# Patient Record
Sex: MALE | Race: WHITE | NOT HISPANIC OR LATINO | URBAN - METROPOLITAN AREA
[De-identification: names, ages, dates, MRNs, and addresses within clinical notes are randomized per-mention and may not be internally consistent; named-entity substitution may affect disease eponyms.]

---

## 2017-07-03 ENCOUNTER — EMERGENCY (EMERGENCY)
Facility: HOSPITAL | Age: 47
LOS: 0 days | Discharge: HOME | End: 2017-07-03

## 2017-07-03 DIAGNOSIS — R06.2 WHEEZING: ICD-10-CM

## 2017-07-03 DIAGNOSIS — S09.90XA UNSPECIFIED INJURY OF HEAD, INITIAL ENCOUNTER: ICD-10-CM

## 2017-07-03 DIAGNOSIS — S62.331A DISPLACED FRACTURE OF NECK OF SECOND METACARPAL BONE, LEFT HAND, INITIAL ENCOUNTER FOR CLOSED FRACTURE: ICD-10-CM
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
## 2017-07-03 DIAGNOSIS — S00.81XA ABRASION OF OTHER PART OF HEAD, INITIAL ENCOUNTER: ICD-10-CM

## 2017-07-03 DIAGNOSIS — Z98.890 OTHER SPECIFIED POSTPROCEDURAL STATES: ICD-10-CM
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
## 2017-07-03 DIAGNOSIS — Z87.891 PERSONAL HISTORY OF NICOTINE DEPENDENCE: ICD-10-CM

## 2017-07-03 DIAGNOSIS — M54.2 CERVICALGIA: ICD-10-CM

## 2017-07-03 DIAGNOSIS — M25.562 PAIN IN LEFT KNEE: ICD-10-CM

## 2022-03-21 ENCOUNTER — INPATIENT (INPATIENT)
Facility: HOSPITAL | Age: 52
LOS: 0 days | Discharge: HOME | End: 2022-03-22
Attending: STUDENT IN AN ORGANIZED HEALTH CARE EDUCATION/TRAINING PROGRAM | Admitting: STUDENT IN AN ORGANIZED HEALTH CARE EDUCATION/TRAINING PROGRAM
Payer: SELF-PAY

## 2022-03-21 VITALS
HEART RATE: 89 BPM | RESPIRATION RATE: 20 BRPM | OXYGEN SATURATION: 100 % | DIASTOLIC BLOOD PRESSURE: 102 MMHG | TEMPERATURE: 97 F | WEIGHT: 259.93 LBS | SYSTOLIC BLOOD PRESSURE: 187 MMHG | HEIGHT: 68 IN

## 2022-03-21 DIAGNOSIS — I10 ESSENTIAL (PRIMARY) HYPERTENSION: ICD-10-CM

## 2022-03-21 DIAGNOSIS — R07.9 CHEST PAIN, UNSPECIFIED: ICD-10-CM

## 2022-03-21 DIAGNOSIS — Z98.1 ARTHRODESIS STATUS: Chronic | ICD-10-CM

## 2022-03-21 LAB
ALBUMIN SERPL ELPH-MCNC: 4.6 G/DL — SIGNIFICANT CHANGE UP (ref 3.5–5.2)
ALP SERPL-CCNC: 87 U/L — SIGNIFICANT CHANGE UP (ref 30–115)
ALT FLD-CCNC: 35 U/L — SIGNIFICANT CHANGE UP (ref 0–41)
ANION GAP SERPL CALC-SCNC: 14 MMOL/L — SIGNIFICANT CHANGE UP (ref 7–14)
AST SERPL-CCNC: 21 U/L — SIGNIFICANT CHANGE UP (ref 0–41)
BASOPHILS # BLD AUTO: 0.1 K/UL — SIGNIFICANT CHANGE UP (ref 0–0.2)
BASOPHILS NFR BLD AUTO: 1.2 % — HIGH (ref 0–1)
BILIRUB SERPL-MCNC: 0.4 MG/DL — SIGNIFICANT CHANGE UP (ref 0.2–1.2)
BUN SERPL-MCNC: 21 MG/DL — HIGH (ref 10–20)
CALCIUM SERPL-MCNC: 9.7 MG/DL — SIGNIFICANT CHANGE UP (ref 8.5–10.1)
CHLORIDE SERPL-SCNC: 98 MMOL/L — SIGNIFICANT CHANGE UP (ref 98–110)
CHOLEST SERPL-MCNC: 216 MG/DL — HIGH
CK MB CFR SERPL CALC: 1.1 NG/ML — SIGNIFICANT CHANGE UP (ref 0.6–6.3)
CK MB CFR SERPL CALC: 1.2 NG/ML — SIGNIFICANT CHANGE UP (ref 0.6–6.3)
CK SERPL-CCNC: 61 U/L — SIGNIFICANT CHANGE UP (ref 0–225)
CK SERPL-CCNC: 62 U/L — SIGNIFICANT CHANGE UP (ref 0–225)
CO2 SERPL-SCNC: 26 MMOL/L — SIGNIFICANT CHANGE UP (ref 17–32)
CREAT SERPL-MCNC: 0.9 MG/DL — SIGNIFICANT CHANGE UP (ref 0.7–1.5)
EGFR: 103 ML/MIN/1.73M2 — SIGNIFICANT CHANGE UP
EOSINOPHIL # BLD AUTO: 0.35 K/UL — SIGNIFICANT CHANGE UP (ref 0–0.7)
EOSINOPHIL NFR BLD AUTO: 4.2 % — SIGNIFICANT CHANGE UP (ref 0–8)
GLUCOSE SERPL-MCNC: 213 MG/DL — HIGH (ref 70–99)
HCT VFR BLD CALC: 43.1 % — SIGNIFICANT CHANGE UP (ref 42–52)
HDLC SERPL-MCNC: 45 MG/DL — SIGNIFICANT CHANGE UP
HGB BLD-MCNC: 14.7 G/DL — SIGNIFICANT CHANGE UP (ref 14–18)
IMM GRANULOCYTES NFR BLD AUTO: 0.2 % — SIGNIFICANT CHANGE UP (ref 0.1–0.3)
LIPID PNL WITH DIRECT LDL SERPL: 135 MG/DL — HIGH
LYMPHOCYTES # BLD AUTO: 3.03 K/UL — SIGNIFICANT CHANGE UP (ref 1.2–3.4)
LYMPHOCYTES # BLD AUTO: 36 % — SIGNIFICANT CHANGE UP (ref 20.5–51.1)
MCHC RBC-ENTMCNC: 30.1 PG — SIGNIFICANT CHANGE UP (ref 27–31)
MCHC RBC-ENTMCNC: 34.1 G/DL — SIGNIFICANT CHANGE UP (ref 32–37)
MCV RBC AUTO: 88.3 FL — SIGNIFICANT CHANGE UP (ref 80–94)
MONOCYTES # BLD AUTO: 0.73 K/UL — HIGH (ref 0.1–0.6)
MONOCYTES NFR BLD AUTO: 8.7 % — SIGNIFICANT CHANGE UP (ref 1.7–9.3)
NEUTROPHILS # BLD AUTO: 4.19 K/UL — SIGNIFICANT CHANGE UP (ref 1.4–6.5)
NEUTROPHILS NFR BLD AUTO: 49.7 % — SIGNIFICANT CHANGE UP (ref 42.2–75.2)
NON HDL CHOLESTEROL: 171 MG/DL — HIGH
NRBC # BLD: 0 /100 WBCS — SIGNIFICANT CHANGE UP (ref 0–0)
PLATELET # BLD AUTO: 380 K/UL — SIGNIFICANT CHANGE UP (ref 130–400)
POTASSIUM SERPL-MCNC: 3.7 MMOL/L — SIGNIFICANT CHANGE UP (ref 3.5–5)
POTASSIUM SERPL-SCNC: 3.7 MMOL/L — SIGNIFICANT CHANGE UP (ref 3.5–5)
PROT SERPL-MCNC: 6.9 G/DL — SIGNIFICANT CHANGE UP (ref 6–8)
RBC # BLD: 4.88 M/UL — SIGNIFICANT CHANGE UP (ref 4.7–6.1)
RBC # FLD: 13.2 % — SIGNIFICANT CHANGE UP (ref 11.5–14.5)
SARS-COV-2 RNA SPEC QL NAA+PROBE: SIGNIFICANT CHANGE UP
SODIUM SERPL-SCNC: 138 MMOL/L — SIGNIFICANT CHANGE UP (ref 135–146)
TRIGL SERPL-MCNC: 345 MG/DL — HIGH
TROPONIN T SERPL-MCNC: <0.01 NG/ML — SIGNIFICANT CHANGE UP
TSH SERPL-MCNC: 2.42 UIU/ML — SIGNIFICANT CHANGE UP (ref 0.27–4.2)
WBC # BLD: 8.42 K/UL — SIGNIFICANT CHANGE UP (ref 4.8–10.8)
WBC # FLD AUTO: 8.42 K/UL — SIGNIFICANT CHANGE UP (ref 4.8–10.8)

## 2022-03-21 PROCEDURE — 99285 EMERGENCY DEPT VISIT HI MDM: CPT

## 2022-03-21 PROCEDURE — 99053 MED SERV 10PM-8AM 24 HR FAC: CPT

## 2022-03-21 PROCEDURE — 99252 IP/OBS CONSLTJ NEW/EST SF 35: CPT

## 2022-03-21 PROCEDURE — 93010 ELECTROCARDIOGRAM REPORT: CPT

## 2022-03-21 PROCEDURE — 71045 X-RAY EXAM CHEST 1 VIEW: CPT | Mod: 26

## 2022-03-21 PROCEDURE — ZZZZZ: CPT

## 2022-03-21 PROCEDURE — 93010 ELECTROCARDIOGRAM REPORT: CPT | Mod: 77

## 2022-03-21 RX ORDER — CHLORHEXIDINE GLUCONATE 213 G/1000ML
1 SOLUTION TOPICAL
Refills: 0 | Status: DISCONTINUED | OUTPATIENT
Start: 2022-03-21 | End: 2022-03-22

## 2022-03-21 RX ORDER — AMLODIPINE BESYLATE 2.5 MG/1
5 TABLET ORAL DAILY
Refills: 0 | Status: DISCONTINUED | OUTPATIENT
Start: 2022-03-21 | End: 2022-03-22

## 2022-03-21 RX ORDER — PANTOPRAZOLE SODIUM 20 MG/1
40 TABLET, DELAYED RELEASE ORAL
Refills: 0 | Status: DISCONTINUED | OUTPATIENT
Start: 2022-03-21 | End: 2022-03-22

## 2022-03-21 RX ORDER — ACETAMINOPHEN 500 MG
650 TABLET ORAL EVERY 6 HOURS
Refills: 0 | Status: DISCONTINUED | OUTPATIENT
Start: 2022-03-21 | End: 2022-03-22

## 2022-03-21 RX ORDER — PANTOPRAZOLE SODIUM 20 MG/1
40 TABLET, DELAYED RELEASE ORAL ONCE
Refills: 0 | Status: COMPLETED | OUTPATIENT
Start: 2022-03-21 | End: 2022-03-21

## 2022-03-21 RX ORDER — ASPIRIN/CALCIUM CARB/MAGNESIUM 324 MG
324 TABLET ORAL ONCE
Refills: 0 | Status: COMPLETED | OUTPATIENT
Start: 2022-03-21 | End: 2022-03-21

## 2022-03-21 RX ORDER — ATORVASTATIN CALCIUM 80 MG/1
40 TABLET, FILM COATED ORAL AT BEDTIME
Refills: 0 | Status: DISCONTINUED | OUTPATIENT
Start: 2022-03-21 | End: 2022-03-22

## 2022-03-21 RX ORDER — ENOXAPARIN SODIUM 100 MG/ML
40 INJECTION SUBCUTANEOUS EVERY 24 HOURS
Refills: 0 | Status: DISCONTINUED | OUTPATIENT
Start: 2022-03-21 | End: 2022-03-22

## 2022-03-21 RX ADMIN — PANTOPRAZOLE SODIUM 40 MILLIGRAM(S): 20 TABLET, DELAYED RELEASE ORAL at 08:29

## 2022-03-21 RX ADMIN — Medication 324 MILLIGRAM(S): at 06:18

## 2022-03-21 RX ADMIN — Medication 650 MILLIGRAM(S): at 17:31

## 2022-03-21 RX ADMIN — Medication 650 MILLIGRAM(S): at 18:01

## 2022-03-21 RX ADMIN — ATORVASTATIN CALCIUM 40 MILLIGRAM(S): 80 TABLET, FILM COATED ORAL at 22:33

## 2022-03-21 RX ADMIN — AMLODIPINE BESYLATE 5 MILLIGRAM(S): 2.5 TABLET ORAL at 08:28

## 2022-03-21 NOTE — ED PROVIDER NOTE - ATTENDING CONTRIBUTION TO CARE
I personally evaluated the patient. I reviewed the Resident´s or Physician Assistant´s note (as assigned above), and agree with the findings and plan except as documented in my note.  51-year-old male, history of cervical fusion, presents with chest tightness and lightheadedness while watching TV tonight.  No fever or cough.  Unvaccinated for Covid.  Denies cocaine use.  Exam shows alert patient in no distress, HEENT NCAT PERRL, neck supple, lungs clear, RR S1S2, abdomen soft NT +BS, no CCE, neuro A&OX3 GCS 15 no deficits. I personally evaluated the patient. I reviewed the Resident´s or Physician Assistant´s note (as assigned above), and agree with the findings and plan except as documented in my note.  51-year-old male, history of cervical fusion, diverticulitis, presents with chest tightness and lightheadedness while watching TV tonight.  No fever or cough.  Unvaccinated for Covid.  Denies cocaine use.  Exam shows alert patient in no distress, HEENT NCAT PERRL, neck supple, lungs clear, RR S1S2, abdomen soft NT +BS, no CCE, neuro A&OX3 GCS 15 no deficits.

## 2022-03-21 NOTE — ED PROVIDER NOTE - NSCAREINITIATED _GEN_ER
[place]) ROM/gait, locomotion, and balance/muscle strength/joint integrity and mobility/ergonomics and body mechanics

## 2022-03-21 NOTE — CONSULT NOTE ADULT - SUBJECTIVE AND OBJECTIVE BOX
HPI:  Pt is a 51M w/ PMH diverticulitis and chronic back pain being admitted for chest tightness r/o ACS. Pt reports onset of intermittent substernal chest tightness around 4AM this morning. Pt states that when the pain starts he has difficulty breathing and feels the need to belch which then relieves the pain. Pain is non-exertional, and non-pleuritic. Pt reports this has never happened to him before. Pt endorses daily NSAID use, ibuprofen 800mg 1-2x per day, which he utilizes for frequent HAs 2/2 chronic cervical spine issues. Denies HA, dizziness, fever/chills, diaphoresis, NVD, abdominal pain, change in urination and change in BM.     In ED, pt is afebrile w/ WBC: 8.4. BP: 187/102 --> 150/70, HR: 89, BUN/cr: 21/0.9, trop (-) x1, CXR pending. Pt was given ASA 325mg x1.  (21 Mar 2022 07:22)        HPI-Cardiology   Pt evaluated at bedside with Dr Celis. Currently admitted in telemetry for evaluation of chest tightness r/o ACS. Radiology tests and hospital records, were reviewed, as well as previous notes on this patient. Pt describes the pain as more discomfort than tightness. Sates that it started this am and was trying to "catch his breath" and "belch". States that once he was able to catch is breath the discomfort disappeared. Pt states that he has GERD, and has had similar episodes, but not discomfort like this. Pt currently denies any CP, SOB or palpitations. Denies any dizziness or lightheadedness.        PAST MEDICAL & SURGICAL HISTORY  History of diverticulitis    Chronic back pain    History of fusion of cervical spine        FAMILY HISTORY:  FAMILY HISTORY:      SOCIAL HISTORY:  []smoker-former  []Alcohol-socially  []Drug-denies    ALLERGIES:  No Known Allergies      MEDICATIONS:  MEDICATIONS  (STANDING):  amLODIPine   Tablet 5 milliGRAM(s) Oral daily  chlorhexidine 4% Liquid 1 Application(s) Topical <User Schedule>  enoxaparin Injectable 40 milliGRAM(s) SubCutaneous every 24 hours  pantoprazole    Tablet 40 milliGRAM(s) Oral before breakfast    MEDICATIONS  (PRN):  acetaminophen     Tablet .. 650 milliGRAM(s) Oral every 6 hours PRN Temp greater or equal to 38C (100.4F), Mild Pain (1 - 3)  aluminum hydroxide/magnesium hydroxide/simethicone Suspension 30 milliLiter(s) Oral every 6 hours PRN Dyspepsia      HOME MEDICATIONS:  Home Medications:      VITALS:   T(F): 97.5 (03-21 @ 13:43), Max: 97.5 (03-21 @ 13:43)  HR: 62 (03-21 @ 13:43) (62 - 89)  BP: 146/72 (03-21 @ 13:43) (146/72 - 187/102)  BP(mean): --  RR: 16 (03-21 @ 13:43) (16 - 20)  SpO2: 100% (03-21 @ 07:19) (100% - 100%)    I&O's Summary      REVIEW OF SYSTEMS:  CONSTITUTIONAL: No weakness, fevers or chills  EYES: No visual changes  ENT: No vertigo or throat pain   NECK: No pain or stiffness  RESPIRATORY: No cough, wheezing, hemoptysis; No shortness of breath  CARDIOVASCULAR: No chest pain or palpitations  GASTROINTESTINAL: No abdominal or epigastric pain. No nausea, vomiting, or hematemesis; No diarrhea or constipation. No melena or hematochezia.  GENITOURINARY: No dysuria, frequency or hematuria  NEUROLOGICAL: No numbness or weakness  SKIN: No itching, no rashes  MSK: no    PHYSICAL EXAM:  NEURO: patient is awake , alert and oriented  GEN: Not in acute distress  NECK: no thyroid enlargement, no JVD  LUNGS: Clear to auscultation bilaterally   CARDIOVASCULAR: S1/S2 present, RRR , no murmurs or rubs, no carotid bruits,  + PP bilaterally  ABD: Soft, non-tender, non-distended, +BS  EXT: No SALO  SKIN: Intact    LABS:                        14.7   8.42  )-----------( 380      ( 21 Mar 2022 06:00 )             43.1     03-21    138  |  98  |  21<H>  ----------------------------<  213<H>  3.7   |  26  |  0.9    Ca    9.7      21 Mar 2022 06:00    TPro  6.9  /  Alb  4.6  /  TBili  0.4  /  DBili  x   /  AST  21  /  ALT  35  /  AlkPhos  87  03-21      Creatine Kinase, Serum: 61 U/L (03-21-22 @ 16:01)  Troponin T, Serum: <0.01 ng/mL (03-21-22 @ 16:01)  Troponin T, Serum: <0.01 ng/mL (03-21-22 @ 06:00)    CARDIAC MARKERS ( 21 Mar 2022 16:01 )  x     / <0.01 ng/mL / 61 U/L / x     / 1.2 ng/mL  CARDIAC MARKERS ( 21 Mar 2022 06:00 )  x     / <0.01 ng/mL / x     / x     / x            03-21 Chol 216<H> LDL -- HDL 45 Trig 345<H>      RADIOLOGY:  -CXR:  < from: Xray Chest 1 View- PORTABLE-Urgent (03.21.22 @ 05:58) >  Impression:    No radiographic evidence of acute cardiopulmonary disease.        --- End of Report ---    < end of copied text >      ECG:  < from: 12 Lead ECG (03.21.22 @ 10:10) >  Sinus bradycardia  Left anterior fascicular block  Poor R wave progression  Nonspecific ST and T wave abnormality  Abnormal ECG    Confirmed by Yusuf Celis ([id number]) on 3/21/2022 10:52:04 AM    < end of copied text >

## 2022-03-21 NOTE — H&P ADULT - PROBLEM SELECTOR PLAN 1
-  - Relieved with belching; known chronic NSAID user  - First trop negative  - Trend cardiac enzymes   - Repeat EKG  - Start protonix   - Advised about risks of daily NSAID use and to utilize NSAIDs w/ food  - Check HgA1C and lipid panel

## 2022-03-21 NOTE — H&P ADULT - NSHPPHYSICALEXAM_GEN_ALL_CORE
T(C): 36 (03-21-22 @ 05:43), Max: 36 (03-21-22 @ 05:43)  HR: 84 (03-21-22 @ 07:19) (84 - 89)  BP: 150/70 (03-21-22 @ 07:19) (150/70 - 187/102)  RR: 18 (03-21-22 @ 07:19) (18 - 20)  SpO2: 100% (03-21-22 @ 07:19) (100% - 100%)    PHYSICAL EXAM:  GENERAL: NAD, AOx3  HEAD:  Atraumatic, Normocephalic  EYES: PERRLA, conjunctiva and sclera clear  NECK: Supple  CHEST/LUNG: Clear to auscultation bilaterally; No rales, rhonchi or wheezing  HEART: S1,S2 Regular rate and rhythm; No murmurs, rubs, or gallops  ABDOMEN: Soft, nontender, nondistended, no rebound tenderness;  +BS  EXTREMITIES:  2+ peripheral pulses bilaterally and symmetrically, no clubbing, cyanosis, or edema  PSYCH: AAOx3, normal mood and affect  SKIN: No rashes or lesions

## 2022-03-21 NOTE — ED ADULT NURSE REASSESSMENT NOTE - NS ED NURSE REASSESS COMMENT FT1
Received patient from previous RN resting on stretcher. Awake alert and oriented x 3 breathing with ease on room air. Reports chest pressure comes and goes. VS WNL. Admitting MD at bedside for evaluation. Explanation of care provided. Safety and comfort measures in place.

## 2022-03-21 NOTE — ED PROVIDER NOTE - CLINICAL SUMMARY MEDICAL DECISION MAKING FREE TEXT BOX
Labs including troponin negative.  Covid swab negative.  EKG normal sinus rhythm no ST elevation.  Chest x-ray negative.  Will admit to low risk telemetry.

## 2022-03-21 NOTE — H&P ADULT - HISTORY OF PRESENT ILLNESS
Pt is a 51M w/ PMH diverticulitis and chronic back pain being admitted for chest tightness r/o ACS. Pt reports onset of intermittent substernal chest tightness around 4AM this morning. Pt states that when the pain starts he has difficulty breathing and feels the need to belch which then relieves the pain. Pain is non-exertional, and non-pleuritic. Pt reports this has never happened to him before. Pt endorses daily NSAID use, ibuprofen 800mg 1-2x per day, which he utilizes for frequent HAs 2/2 chronic cervical spine issues. Denies HA, dizziness, fever/chills, diaphoresis, NVD, abdominal pain, change in urination and change in BM.     In ED, pt is afebrile w/ WBC: 8.4. BP: 187/102 --> 150/70, HR: 89, BUN/cr: 21/0.9, trop (-) x1, CXR pending. Pt was given ASA 325mg x1.

## 2022-03-21 NOTE — CONSULT NOTE ADULT - ATTENDING COMMENTS
Patient seen and examined. Pertinent labs, imaging and telemetry reviewed. I agree with the above.     Patient without CP. Found to have bradycardia, but unknown if at baseline.   Elevated lipids, with history of smoking-ASCVD 5.6% 10 year risk (borderline).     R/O ACS:  -Trop negative and EKG nonischemic  -CCTA today. Will betablock as needed.   -ASA and atorvastatin 40mg PO daily.     Bradycardia: HR 50-60s, drops to 30s in sleep  -May be baseline.  -Will continue to monitor.   -If remains low, may need MCOT monitor as outpatient.   -F/U TSH    Discussed with NP

## 2022-03-21 NOTE — ED PROVIDER NOTE - NS ED ATTENDING STATEMENT MOD
This was a shared visit with the DAVID. I reviewed and verified the documentation and independently performed the documented:

## 2022-03-21 NOTE — H&P ADULT - NSHPLABSRESULTS_GEN_ALL_CORE
14.7   8.42  )-----------( 380      ( 21 Mar 2022 06:00 )             43.1       03-21    138  |  98  |  21<H>  ----------------------------<  213<H>  3.7   |  26  |  0.9    Ca    9.7      21 Mar 2022 06:00    TPro  6.9  /  Alb  4.6  /  TBili  0.4  /  DBili  x   /  AST  21  /  ALT  35  /  AlkPhos  87  03-21            CARDIAC MARKERS ( 21 Mar 2022 06:00 )  x     / <0.01 ng/mL / x     / x     / x          CXR pending

## 2022-03-21 NOTE — H&P ADULT - PROBLEM SELECTOR PLAN 2
-  - 187/102 --> 150/70  - No known prior history  - Start Norvasc 5mg QD   - Monitor BP   - DASH diet

## 2022-03-21 NOTE — ED ADULT NURSE NOTE - SUICIDE SCREENING DEPRESSION
Negative Patient complains of headache, cough, rhinorrhea, congestion, ear pain that started sunday and throat pain that started last night

## 2022-03-21 NOTE — H&P ADULT - ATTENDING COMMENTS
Mr Hernandez is a 51M w/ PMH diverticulitis and chronic back pain being admitted for chest tightness r/o ACS. Pain is relieved with belching. Pt endorses daily NSAID use, ibuprofen 800mg 1-2x per day.  hx suggestive of GI etiology.  Given age of 51 and last colonoscopy 15 yrs ago, pt will benefit from outpatient EGD/Colonoscopy.  will start patient on Protonix.  repeat Troponins x3.  will get A1c and lipid profile.  Pt has morbid obesity.    patient likely has metabolic syndrome.  Will need outpatient f/u for evaluation and management of HTN and Obesity.

## 2022-03-21 NOTE — ED PROVIDER NOTE - OBJECTIVE STATEMENT
52 yo male hx of diverticulitis present c/o feeling chest tightness and lightheadedness while he was watching TV this am. feeling tingling down his left arm so he comes to ED for evaluation. denies similar sxs in the past. denies diaphoresis/weakness and sob associates with chest discomfort. Denies exogenous hormone use/recent hospitalization/hx of DVT. denies recent illness/fever/chill/HA/dizziness/sob/abd pain/n/v/d/urinary sxs.

## 2022-03-21 NOTE — H&P ADULT - ASSESSMENT
ASSESSMENT: Pt is a 51M w/ PMH diverticulitis and chronic back pain being admitted for chest tightness r/o ACS. Pain is relieved with belching. Pt endorses daily NSAID use, ibuprofen 800mg 1-2x per day.  In ED, pt is afebrile w/ WBC: 8.4. BP: 187/102 --> 150/70, HR: 89, BUN/cr: 21/0.9, trop (-) x1, CXR pending. Pt was given ASA 325mg x1.         PLAN: Case d/w Dr. Rashid   - Admit to inpatient level of care - non-ccu tele  - Ambulate as tolerated   - Monitor vitals  - Daily labs   - CHG bath   - DASH diet   - VTE ppx: SCDs   - GI ppx: start protonix   - Tylenol PRN for neck pain/HA ASSESSMENT: Pt is a 51M w/ PMH diverticulitis and chronic back pain being admitted for chest tightness r/o ACS. Pain is relieved with belching. Pt endorses daily NSAID use, ibuprofen 800mg 1-2x per day.  In ED, pt is afebrile w/ WBC: 8.4. BP: 187/102 --> 150/70, HR: 89, BUN/cr: 21/0.9, trop (-) x1, CXR pending. Pt was given ASA 325mg x1.         PLAN: Case d/w Dr. Rashid   - Admit to inpatient level of care - non-ccu tele  - Ambulate as tolerated   - Monitor vitals  - Daily labs   - CHG bath   - DASH diet   - VTE ppx: SQ lovenox   - GI ppx: start protonix   - Tylenol PRN for neck pain/HA

## 2022-03-21 NOTE — PATIENT PROFILE ADULT - FALL HARM RISK - UNIVERSAL INTERVENTIONS
Bed in lowest position, wheels locked, appropriate side rails in place/Call bell, personal items and telephone in reach/Instruct patient to call for assistance before getting out of bed or chair/Non-slip footwear when patient is out of bed/[place] to call system/Physically safe environment - no spills, clutter or unnecessary equipment/Purposeful Proactive Rounding/Room/bathroom lighting operational, light cord in reach

## 2022-03-21 NOTE — CONSULT NOTE ADULT - ASSESSMENT
51M w/ PMH diverticulitis and chronic back pain was admitted for chest discomfort r/o ACS. Found to have bradycardia on ECG and telemonitor      Impression:  #Chest discomfort: r/o ACS  #Bradycardia  -Atypical CP  -Asymptomatic susu  -Trop flat x2  -CKMB, CK are WNL  -ECG: Bradycardia, non ischemic, no heart blocks  -TTE pending  -Cxr: unremarkable    Trig 345  Chol 216    Plan:  -Currently CP free  -Trend trop x1 more  -Rpeat ECG in am  -CCTA tomorrow 3/22  -Check TSH, A1C  -When ambulating HR>60   -Start Lipitor 40mg PO daily  -Monitor lytes and replete as needed    Discussed with Dr Celis

## 2022-03-22 VITALS — TEMPERATURE: 97 F | HEART RATE: 61 BPM | SYSTOLIC BLOOD PRESSURE: 152 MMHG | DIASTOLIC BLOOD PRESSURE: 68 MMHG

## 2022-03-22 LAB
A1C WITH ESTIMATED AVERAGE GLUCOSE RESULT: 6.5 % — HIGH (ref 4–5.6)
ANION GAP SERPL CALC-SCNC: 10 MMOL/L — SIGNIFICANT CHANGE UP (ref 7–14)
BUN SERPL-MCNC: 19 MG/DL — SIGNIFICANT CHANGE UP (ref 10–20)
CALCIUM SERPL-MCNC: 9.7 MG/DL — SIGNIFICANT CHANGE UP (ref 8.5–10.1)
CHLORIDE SERPL-SCNC: 102 MMOL/L — SIGNIFICANT CHANGE UP (ref 98–110)
CO2 SERPL-SCNC: 29 MMOL/L — SIGNIFICANT CHANGE UP (ref 17–32)
CREAT SERPL-MCNC: 0.9 MG/DL — SIGNIFICANT CHANGE UP (ref 0.7–1.5)
EGFR: 103 ML/MIN/1.73M2 — SIGNIFICANT CHANGE UP
ESTIMATED AVERAGE GLUCOSE: 140 MG/DL — HIGH (ref 68–114)
GLUCOSE SERPL-MCNC: 142 MG/DL — HIGH (ref 70–99)
HCT VFR BLD CALC: 45.8 % — SIGNIFICANT CHANGE UP (ref 42–52)
HGB BLD-MCNC: 15.7 G/DL — SIGNIFICANT CHANGE UP (ref 14–18)
MCHC RBC-ENTMCNC: 30.5 PG — SIGNIFICANT CHANGE UP (ref 27–31)
MCHC RBC-ENTMCNC: 34.3 G/DL — SIGNIFICANT CHANGE UP (ref 32–37)
MCV RBC AUTO: 88.9 FL — SIGNIFICANT CHANGE UP (ref 80–94)
NRBC # BLD: 0 /100 WBCS — SIGNIFICANT CHANGE UP (ref 0–0)
PLATELET # BLD AUTO: 384 K/UL — SIGNIFICANT CHANGE UP (ref 130–400)
POTASSIUM SERPL-MCNC: 4.7 MMOL/L — SIGNIFICANT CHANGE UP (ref 3.5–5)
POTASSIUM SERPL-SCNC: 4.7 MMOL/L — SIGNIFICANT CHANGE UP (ref 3.5–5)
RBC # BLD: 5.15 M/UL — SIGNIFICANT CHANGE UP (ref 4.7–6.1)
RBC # FLD: 12.9 % — SIGNIFICANT CHANGE UP (ref 11.5–14.5)
SODIUM SERPL-SCNC: 141 MMOL/L — SIGNIFICANT CHANGE UP (ref 135–146)
WBC # BLD: 8.08 K/UL — SIGNIFICANT CHANGE UP (ref 4.8–10.8)
WBC # FLD AUTO: 8.08 K/UL — SIGNIFICANT CHANGE UP (ref 4.8–10.8)

## 2022-03-22 PROCEDURE — 75571 CT HRT W/O DYE W/CA TEST: CPT | Mod: 26

## 2022-03-22 PROCEDURE — 93306 TTE W/DOPPLER COMPLETE: CPT | Mod: 26

## 2022-03-22 PROCEDURE — 99233 SBSQ HOSP IP/OBS HIGH 50: CPT

## 2022-03-22 PROCEDURE — 99232 SBSQ HOSP IP/OBS MODERATE 35: CPT

## 2022-03-22 RX ORDER — LOSARTAN POTASSIUM 100 MG/1
25 TABLET, FILM COATED ORAL DAILY
Refills: 0 | Status: DISCONTINUED | OUTPATIENT
Start: 2022-03-22 | End: 2022-03-22

## 2022-03-22 RX ORDER — ATORVASTATIN CALCIUM 80 MG/1
1 TABLET, FILM COATED ORAL
Qty: 30 | Refills: 0
Start: 2022-03-22 | End: 2022-04-20

## 2022-03-22 RX ORDER — METFORMIN HYDROCHLORIDE 850 MG/1
1 TABLET ORAL
Qty: 60 | Refills: 0
Start: 2022-03-22 | End: 2022-04-20

## 2022-03-22 RX ORDER — NITROGLYCERIN 6.5 MG
0.8 CAPSULE, EXTENDED RELEASE ORAL ONCE
Refills: 0 | Status: DISCONTINUED | OUTPATIENT
Start: 2022-03-22 | End: 2022-03-22

## 2022-03-22 RX ORDER — AMLODIPINE BESYLATE 2.5 MG/1
1 TABLET ORAL
Qty: 30 | Refills: 0
Start: 2022-03-22 | End: 2022-04-20

## 2022-03-22 RX ORDER — LOSARTAN POTASSIUM 100 MG/1
1 TABLET, FILM COATED ORAL
Qty: 30 | Refills: 0
Start: 2022-03-22 | End: 2022-04-20

## 2022-03-22 RX ORDER — ASPIRIN/CALCIUM CARB/MAGNESIUM 324 MG
81 TABLET ORAL DAILY
Refills: 0 | Status: DISCONTINUED | OUTPATIENT
Start: 2022-03-22 | End: 2022-03-22

## 2022-03-22 RX ORDER — ASPIRIN/CALCIUM CARB/MAGNESIUM 324 MG
1 TABLET ORAL
Qty: 30 | Refills: 0
Start: 2022-03-22 | End: 2022-04-20

## 2022-03-22 RX ADMIN — AMLODIPINE BESYLATE 5 MILLIGRAM(S): 2.5 TABLET ORAL at 06:43

## 2022-03-22 RX ADMIN — PANTOPRAZOLE SODIUM 40 MILLIGRAM(S): 20 TABLET, DELAYED RELEASE ORAL at 06:43

## 2022-03-22 RX ADMIN — Medication 650 MILLIGRAM(S): at 13:00

## 2022-03-22 RX ADMIN — Medication 650 MILLIGRAM(S): at 12:13

## 2022-03-22 RX ADMIN — ENOXAPARIN SODIUM 40 MILLIGRAM(S): 100 INJECTION SUBCUTANEOUS at 12:14

## 2022-03-22 NOTE — DISCHARGE NOTE PROVIDER - CARE PROVIDER_API CALL
Arnaldo Aragon (MD)  Critical Care Medicine; Internal Medicine; Pulmonary Disease  [address], [address]  [place] [address]  Phone: [phone]  Fax: [phone]  Follow Up Time: 2 weeks    PCP,   Phone: (   )    -  Fax: (   )    -  Follow Up Time: 1 week    Yusuf Celis (DO)  Cardiovascular Disease; Internal Medicine  [address] [place] [address]  Phone: [phone]  Fax: [phone]  Follow Up Time: 2 weeks    Shaun Silva)  EndocrinologyMetabDiabetes; Internal Medicine  [address] [place] [address]  Phone: [phone]  Fax: [phone]  Follow Up Time: 2 weeks

## 2022-03-22 NOTE — DISCHARGE NOTE NURSING/CASE MANAGEMENT/SOCIAL WORK - PATIENT PORTAL LINK FT
You can access the FollowMyHealth Patient Portal offered by [facility] by registering at the following website: [email or web address]. By joining [facility]’s FollowMyHealth portal, you will also be able to view your health information using other applications (apps) compatible with our system.

## 2022-03-22 NOTE — DISCHARGE NOTE PROVIDER - NSDCCONDITION_GEN_ALL_CORE
Writer contacted patient at appt time. Phone didn't answer. Left voicemail for patient mother regarding missed appointment.   Stable

## 2022-03-22 NOTE — DISCHARGE NOTE NURSING/CASE MANAGEMENT/SOCIAL WORK - NSDCPEFALRISK_GEN_ALL_CORE
For information on Fall & Injury Prevention, visit: [email or web address] OR  [email or web address] OR  https://www.cdc.gov/steadi/patient.html

## 2022-03-22 NOTE — DISCHARGE NOTE PROVIDER - CARE PROVIDERS DIRECT ADDRESSES
,DirectAddress_Unknown,DirectAddress_Unknown,DirectAddress_Unknown,[email or web address]

## 2022-03-22 NOTE — DISCHARGE NOTE PROVIDER - HOSPITAL COURSE
Mr Hernandez is a 51M w/ MHx of diverticulitis and chronic back pain being admitted to telemetry for chest tightness r/o ACS. Pain is relieved with belching.     #Acute chest pain   #Bradycardia   #HLD   Relieved with belching; known chronic NSAID user  trop negative x 3  Advised about risks of daily NSAID use and to utilize NSAIDs w/ food  Cholesterol, Serum: 216 mg/dL, Triglycerides, Serum: 345 mg/dL, HDL Cholesterol, Serum: 45 mg/dL  TSH 2.42  c/w protonix    Initiated Atorvastatin 40mg qhs   planned for CCTA - unable to do due to high calcium score  TTE - normal LV function, EF 68%  start ASA 81mg   outpatient follow up with cardiology for stress test       #Metabolic Syndrome   #New Onset DM II   Waist Circumference of >40  elevated BP   insulin resistance   Lifestyle Modifications   will initiate Metformin 500mg BID on discharge   outpatient f/u with Endocrinologist       #Hypertension.   187/102 --> 150/70> 141/66  No known prior history  c/w Norvasc 5mg QD   start Losartan 25mg daily, will increase to Losartan 50 mg po qd on discharge  DASH diet    Pt is stable for a medical dc with outpatient follow up with cardiology, endocrinology and pulmonary for sleep study

## 2022-03-22 NOTE — CHART NOTE - NSCHARTNOTEFT_GEN_A_CORE
RN notified me of persistent   SINUS LUCY CARDIA     d/w dr. Rashid   was seen by him     plan :  TSH level   cards consult   echo   still needs to be r/o ACS    rn aware
Patient underwent CCTA for chest pain work up today. Given high coronary artery calcium burden, unable to complete CCTA with contrast.   < from: CT Heart Calcium Score (03.22.22 @ 10:26) >    Vessel              Calcium Score    =======================================================  LM:                    32  LAD:                 472  LCX:                 229  RCA:                 1612  =======================================================  Total:                2345        The heart is normal in size. There are aortic calcifications. There is no   pleural or pericardial effusion. There is a 2 mm nodule within the right   middle lobe. Please note that the entire lungs were not imaged.    Images of the upper abdomen demonstrate no abnormality.    There is partially imaged cervical spine hardware.    The total Agatston coronary artery calcium score equals 2345, which   corresponds to 99th percentile for age, gender and ethnicity. Dense   coronary artery calcium precludes the evaluation of the coronary arteries   with intravenous contrast.    There is a 2 mm nodule within the right middle lobe. In a high-risk   patient, noncontrast CT chest recommended in 12 months to assess for   stability.    < end of copied text >    Given findings of significant calcium in LAD, would have further work up with inpatient stress testing, likely Nuclear stress. In addition to aggressive risk factor modification.   Will discuss with patient.

## 2022-03-22 NOTE — DISCHARGE NOTE PROVIDER - NSDCCPCAREPLAN_GEN_ALL_CORE_FT
PRINCIPAL DISCHARGE DIAGNOSIS  Diagnosis: Chest pain  Assessment and Plan of Treatment: your were admitted to hospital and treated  your symptoms improved  please follow up with cardiology for outpatient stress test and pulmonigy for outpatient sleep study      SECONDARY DISCHARGE DIAGNOSES  Diagnosis: Diabetes  Assessment and Plan of Treatment: you are started on metformin for diabetes  please take it as directed  follow up with endocrinology in 1-2 weeks for reassessment  follow carb consistent diet    Diagnosis: HTN (hypertension)  Assessment and Plan of Treatment: please monitor your blood pressure daily and take amlodipine and losartan as directed  limit salt intake to 2g/day   follow up with your PCP in 1-2 weeks for reassessment

## 2022-03-22 NOTE — PROGRESS NOTE ADULT - SUBJECTIVE AND OBJECTIVE BOX
ORLYCHARU  51y, Male  Allergy: No Known Allergies    Hospital Day: 1d    Patient seen and examined earlier today.  no complaints.  requesting to go home today.     PMH/PSH:  PAST MEDICAL & SURGICAL HISTORY:  History of diverticulitis    Chronic back pain    History of fusion of cervical spine        LAST 24-Hr EVENTS:    VITALS:  T(F): 97.1 (03-22-22 @ 05:41), Max: 97.5 (03-21-22 @ 13:43)  HR: 60 (03-22-22 @ 05:41)  BP: 141/66 (03-22-22 @ 05:41) (141/66 - 165/81)  RR: 18 (03-22-22 @ 05:41)  SpO2: --          TESTS & MEASUREMENTS:  Weight/BMI  112.7 (03-21-22 @ 08:45)  36.7 (03-21-22 @ 08:45)                          15.7   8.08  )-----------( 384      ( 22 Mar 2022 06:49 )             45.8         03-22    141  |  102  |  19  ----------------------------<  142<H>  4.7   |  29  |  0.9    Ca    9.7      22 Mar 2022 06:49    TPro  6.9  /  Alb  4.6  /  TBili  0.4  /  DBili  x   /  AST  21  /  ALT  35  /  AlkPhos  87  03-21    LIVER FUNCTIONS - ( 21 Mar 2022 06:00 )  Alb: 4.6 g/dL / Pro: 6.9 g/dL / ALK PHOS: 87 U/L / ALT: 35 U/L / AST: 21 U/L / GGT: x           CARDIAC MARKERS ( 21 Mar 2022 21:51 )  x     / <0.01 ng/mL / 62 U/L / x     / 1.1 ng/mL  CARDIAC MARKERS ( 21 Mar 2022 16:01 )  x     / <0.01 ng/mL / 61 U/L / x     / 1.2 ng/mL  CARDIAC MARKERS ( 21 Mar 2022 06:00 )  x     / <0.01 ng/mL / x     / x     / x                      COVID-19 PCR: NotDetec (03-21-22 @ 06:00)        A1C with Estimated Average Glucose Result: 6.5 % (03-21-22 @ 16:01)          RADIOLOGY, ECG, & ADDITIONAL TESTS:  12 Lead ECG:   Ventricular Rate 57 BPM    Atrial Rate 57 BPM    P-R Interval 174 ms    QRS Duration 88 ms    Q-T Interval 434 ms    QTC Calculation(Bazett) 422 ms    P Axis 43 degrees    R Axis -49 degrees    T Axis 51 degrees    Diagnosis Line Sinus bradycardia  Left anterior fascicular block  Poor R wave progression  Nonspecific ST and T wave abnormality  Abnormal ECG    Confirmed by Yusuf Celis ([id number]) on 3/21/2022 10:52:04 AM (03-21-22 @ 10:10)      RECENT DIAGNOSTIC ORDERS:  CT Heart Calcium Score:   Exam Completed (03-22-22 @ 10:25)  Diet, NPO (03-22-22 @ 09:42)  TTE Echo Complete w/o Contrast w/ Doppler:   Transport: Portable  Monitor: w/ Monitor  Provider's Contact #: [phone] (03-21-22 @ 14:46)      MEDICATIONS:  MEDICATIONS  (STANDING):  amLODIPine   Tablet 5 milliGRAM(s) Oral daily  atorvastatin 40 milliGRAM(s) Oral at bedtime  chlorhexidine 4% Liquid 1 Application(s) Topical <User Schedule>  enoxaparin Injectable 40 milliGRAM(s) SubCutaneous every 24 hours  nitroglycerin     SubLingual 0.8 milliGRAM(s) SubLingual once  pantoprazole    Tablet 40 milliGRAM(s) Oral before breakfast    MEDICATIONS  (PRN):  acetaminophen     Tablet .. 650 milliGRAM(s) Oral every 6 hours PRN Temp greater or equal to 38C (100.4F), Mild Pain (1 - 3)  aluminum hydroxide/magnesium hydroxide/simethicone Suspension 30 milliLiter(s) Oral every 6 hours PRN Dyspepsia      HOME MEDICATIONS:      PHYSICAL EXAM:  GENERAL: NAD, AOx3  HEAD:  Atraumatic, Normocephalic  EYES: PERRLA, conjunctiva and sclera clear  NECK: Supple  CHEST/LUNG: Clear to auscultation bilaterally; No rales, rhonchi or wheezing  HEART: S1,S2 Regular rate and rhythm; No murmurs, rubs, or gallops  ABDOMEN: Soft, nontender, nondistended, no rebound tenderness;  +BS  EXTREMITIES:  2+ peripheral pulses bilaterally and symmetrically, no clubbing, cyanosis, or edema  PSYCH: AAOx3, normal mood and affect  SKIN: No rashes or lesions

## 2022-03-22 NOTE — DISCHARGE NOTE PROVIDER - NSDCMRMEDTOKEN_GEN_ALL_CORE_FT
amLODIPine 5 mg oral tablet: 1 tab(s) orally once a day   aspirin 81 mg oral delayed release tablet: 1 tab(s) orally once a day   atorvastatin 40 mg oral tablet: 1 tab(s) orally once a day (at bedtime)   losartan 50 mg oral tablet: 1 tab(s) orally once a day   metFORMIN 500 mg oral tablet: 1 tab(s) orally 2 times a day

## 2022-03-22 NOTE — PROGRESS NOTE ADULT - ASSESSMENT
Mr David is a 51M w/ MHx of diverticulitis and chronic back pain being admitted for chest tightness r/o ACS. Pain is relieved with belching. Pt endorses daily NSAID use, ibuprofen 800mg 1-2x per day.  In ED, pt is afebrile w/ WBC: 8.4. BP: 187/102 --> 150/70, HR: 89, BUN/cr: 21/0.9, trop (-) x1, CXR pending. Pt was given ASA 325mg x1.       #Acute chest pain   #Bradycardia   #HLD   Relieved with belching; known chronic NSAID user  trop negative x 3  Advised about risks of daily NSAID use and to utilize NSAIDs w/ food  Cholesterol, Serum: 216 mg/dL, Triglycerides, Serum: 345 mg/dL, HDL Cholesterol, Serum: 45 mg/dL  TSH 2.42  c/w protonix    Initiated Atorvastatin 40mg qhs   planned for CCTA   f/u TTE              Problem/Plan - 2:  ·  Problem: Hypertension.   ·  Plan: -  - 187/102 --> 150/70  - No known prior history  - Start Norvasc 5mg QD   - Monitor BP   - DASH diet. Mr David is a 51M w/ MHx of diverticulitis and chronic back pain being admitted for chest tightness r/o ACS. Pain is relieved with belching. Pt endorses daily NSAID use, ibuprofen 800mg 1-2x per day.  In ED, pt is afebrile w/ WBC: 8.4. BP: 187/102 --> 150/70, HR: 89, BUN/cr: 21/0.9, trop (-) x1, CXR pending. Pt was given ASA 325mg x1.       #Acute chest pain   #Bradycardia   #HLD   Relieved with belching; known chronic NSAID user  trop negative x 3  Advised about risks of daily NSAID use and to utilize NSAIDs w/ food  Cholesterol, Serum: 216 mg/dL, Triglycerides, Serum: 345 mg/dL, HDL Cholesterol, Serum: 45 mg/dL  TSH 2.42  c/w protonix    Initiated Atorvastatin 40mg qhs   planned for CCTA - unable to do due to high calcium score  f/u TTE  start ASA 81mg        #Metabolic Syndrome   #New Onset DM II   Waist Circumference of >40  elevated BP   insulin resistance   Lifestyle Modifications   will initiate Metformin 500mg BID on discharge   outpatient f/u with Endocrinologist       #Hypertension.   187/102 --> 150/70> 141/66  No known prior history  c/w Norvasc 5mg QD   start Losartan 25mg daily   DASH diet    DVT PPx Lovenox     Progress Note Handoff:  Pending: TTE   Dispo: acute

## 2022-03-22 NOTE — DISCHARGE NOTE PROVIDER - PROVIDER TOKENS
PROVIDER:[TOKEN:[[id number]],FOLLOWUP:[2 weeks]],FREE:[LAST:[PCP],PHONE:[(   )    -],FAX:[(   )    -],FOLLOWUP:[1 week]],PROVIDER:[TOKEN:[[id number]],FOLLOWUP:[2 weeks]],PROVIDER:[TOKEN:[[id number]],FOLLOWUP:[2 weeks]]

## 2022-03-30 DIAGNOSIS — R07.89 OTHER CHEST PAIN: ICD-10-CM

## 2022-03-30 DIAGNOSIS — E88.81 METABOLIC SYNDROME AND OTHER INSULIN RESISTANCE: ICD-10-CM

## 2022-03-30 DIAGNOSIS — R00.1 BRADYCARDIA, UNSPECIFIED: ICD-10-CM

## 2022-03-30 DIAGNOSIS — I10 ESSENTIAL (PRIMARY) HYPERTENSION: ICD-10-CM

## 2022-03-30 DIAGNOSIS — E78.5 HYPERLIPIDEMIA, UNSPECIFIED: ICD-10-CM

## 2022-03-30 DIAGNOSIS — K21.9 GASTRO-ESOPHAGEAL REFLUX DISEASE WITHOUT ESOPHAGITIS: ICD-10-CM

## 2022-03-30 DIAGNOSIS — Z79.84 LONG TERM (CURRENT) USE OF ORAL HYPOGLYCEMIC DRUGS: ICD-10-CM

## 2022-03-30 DIAGNOSIS — E66.01 MORBID (SEVERE) OBESITY DUE TO EXCESS CALORIES: ICD-10-CM

## 2022-03-30 DIAGNOSIS — E11.9 TYPE 2 DIABETES MELLITUS WITHOUT COMPLICATIONS: ICD-10-CM

## 2023-01-11 NOTE — ED PROVIDER NOTE - MDM PATIENT STATEMENT FOR ADDL TREATMENT
- Read the attached information about cellulitis and abscess  -Stop taking Bactrim and begin taking Keflex 4 times daily for the next 10 days  -Call your primary care physician if symptoms do not improve over the next several days  -Return to the emergency department if your pain becomes severe, swelling becomes severe, or you notice systemic symptoms such as fevers, chills, or body aches Patient with one or more new problems requiring additional work-up/treatment.

## 2024-01-10 NOTE — PATIENT PROFILE ADULT - REASON FOR REFUSAL (REFER PATIENT TO HEALTHCARE PROVIDER FOR FOLLOW-UP):
----- Message from Donna Cruz NP sent at 1/10/2024 11:11 AM CST -----  Patient is aware of recent PSA results, 0.41.  Has a history of prostate cancer.  Please schedule a repeat PSA in 6 months and call him, does not use the patient portal, with lab time in location.   I do not want it